# Patient Record
(demographics unavailable — no encounter records)

---

## 2024-11-06 NOTE — HISTORY OF PRESENT ILLNESS
[Family Member] : family member [FreeTextEntry1] : follow up for home health forms.  [de-identified] : PMH of HTN, HLD, Dementia.  Recent fall 3 weeks ago. Fell out of chair on the porch outside.  Daughter states patient fell on right shoulder, and hip. Also sprained right ankle.  Seen at [facility]- completed CT scan of head and xray of hip.  CT scan of brain- mild temporal scalp swelling. No acute intracranial abnormality, acute fracture.  Xray of hip- no acute fracture or acute dislocation.   Today patient reports shoulder pain and limited ROM.

## 2024-11-06 NOTE — PLAN
[FreeTextEntry1] : 1. [facility] form- to be completed. Copy to be emailed to daughter.  2. Right shoulder contusion & right ankle sprain  -PT referral ordered for both.  - Continue Tylenol extra strength every 6 hours for pain & inflammation.

## 2024-11-06 NOTE — PHYSICAL EXAM
[Normal Voice/Communication] : normal voice/communication [Normal] : normal rate, regular rhythm, normal S1 and S2 and no murmur heard [de-identified] : Patient alert & oriented, sitting quietly.  [de-identified] : Right shoulder pain, difficulty raising arm above head. +2 right ankle swelling, mild ecchymosis to ankle.

## 2024-11-20 NOTE — ASSESSMENT
[FreeTextEntry1] : 1) HTN  - ct amlodipine / valsartan  5/160  - f.u in 3-6 month   2) Dementia - Ct home care   3) cough - start benzonatate  - flonase  - claritin

## 2024-11-20 NOTE — HISTORY OF PRESENT ILLNESS
[FreeTextEntry1] : F/U   HTN/ HL   reports that she has been taking the meds as prescribed she has been having a chronic cough x 1 year  now has worsening chest congestion x 3 week , no fever no shortness of breath  no h/o asthma  she reports that she has had improvemennt in the cough in the last 2 days

## 2024-11-20 NOTE — PHYSICAL EXAM
[No Acute Distress] : no acute distress [Normal Voice/Communication] : normal voice/communication [Normal Sclera/Conjunctiva] : normal sclera/conjunctiva [Normal] : normal rate, regular rhythm, normal S1 and S2 and no murmur heard

## 2025-04-24 NOTE — REVIEW OF SYSTEMS
[Joint Pain] : joint pain [Joint Stiffness] : joint stiffness [Muscle Pain] : muscle pain [Negative] : Heme/Lymph [FreeTextEntry4] : bilateral tinnitus  [FreeTextEntry9] : Right shoulder pain, decreased ROM. Bilateral knee swelling, intermittent bilateral knee pain.

## 2025-04-24 NOTE — PHYSICAL EXAM
[PERRL] : pupils equal round and reactive to light [EOMI] : extraocular movements intact [Clear to Auscultation] : lungs were clear to auscultation bilaterally [Normal S1, S2] : normal S1 and S2 [Pedal Pulses Present] : the pedal pulses are present [No Edema] : there was no peripheral edema [Soft] : abdomen soft [Non Tender] : non-tender [Grossly Normal Strength/Tone] : grossly normal strength/tone [Normal] : no rash [Coordination Grossly Intact] : coordination grossly intact [Normal Affect] : the affect was normal [Alert and Oriented x3] : oriented to person, place, and time [de-identified] : sitting quitely. Response appropriately  [de-identified] : decreased ROM in right arm. Pain when lifting arm above hear. Bilateral knee swelling.

## 2025-04-24 NOTE — HISTORY OF PRESENT ILLNESS
[Family Member] : family member [FreeTextEntry1] : ELDA  [de-identified] : 75 year old female with PMH of HTN, chronic insomnia, GERD, HLD, dementia.   Presents for annual wellness visit.  5lbs weight gain since November. No changes in appetite. Not working out much.  No recent falls or injuries.  Decreased risperidone 2.5mg to 2.25 last month. See Dr. Hernandez ([facility])  Neurology 4/29.

## 2025-04-24 NOTE — HEALTH RISK ASSESSMENT
[Good] : ~his/her~  mood as  good [No] : No [1 or 2 (0 pts)] : 1 or 2 (0 points) [Never (0 pts)] : Never (0 points) [0] : 2) Feeling down, depressed, or hopeless: Not at all (0) [PHQ-2 Negative - No further assessment needed] : PHQ-2 Negative - No further assessment needed [Never] : Never [Audit-CScore] : 0 [de-identified] : Stationary bike at home  [de-identified] : Regular  [[id number]] : 0 [Patient reported bone density results were normal] : Patient reported bone density results were normal [With Family] : lives with family [Reports changes in hearing] : Reports no changes in hearing [Reports changes in vision] : Reports no changes in vision [Reports changes in dental health] : Reports no changes in dental health [Smoke Detector] : smoke detector [Carbon Monoxide Detector] : carbon monoxide detector [Seat Belt] :  uses seat belt [MammogramDate] : 3/19/25 [MammogramComments] : fibroglandular density. No changes in nodular areas.  [PapSmearComments] : N/A [BoneDensityDate] : 2020 [ColonoscopyDate] : 10/2023 [ColonoscopyComments] : hemorrhoids.  [Name: ___] : Health Care Proxy's Name: [unfilled]  [Relationship: ___] : Relationship: [unfilled]